# Patient Record
Sex: FEMALE | Race: WHITE | NOT HISPANIC OR LATINO
[De-identification: names, ages, dates, MRNs, and addresses within clinical notes are randomized per-mention and may not be internally consistent; named-entity substitution may affect disease eponyms.]

---

## 2017-10-05 ENCOUNTER — TRANSCRIPTION ENCOUNTER (OUTPATIENT)
Age: 82
End: 2017-10-05

## 2018-01-15 ENCOUNTER — APPOINTMENT (OUTPATIENT)
Dept: SURGICAL ONCOLOGY | Facility: CLINIC | Age: 83
End: 2018-01-15
Payer: MEDICARE

## 2018-01-15 VITALS
HEART RATE: 77 BPM | SYSTOLIC BLOOD PRESSURE: 182 MMHG | DIASTOLIC BLOOD PRESSURE: 73 MMHG | WEIGHT: 118 LBS | BODY MASS INDEX: 23.16 KG/M2 | HEIGHT: 60 IN | OXYGEN SATURATION: 100 %

## 2018-01-15 DIAGNOSIS — N64.4 MASTODYNIA: ICD-10-CM

## 2018-01-15 DIAGNOSIS — Z78.9 OTHER SPECIFIED HEALTH STATUS: ICD-10-CM

## 2018-01-15 PROCEDURE — 99204 OFFICE O/P NEW MOD 45 MIN: CPT

## 2018-01-26 ENCOUNTER — APPOINTMENT (OUTPATIENT)
Dept: COLORECTAL SURGERY | Facility: CLINIC | Age: 83
End: 2018-01-26
Payer: MEDICARE

## 2018-01-26 PROCEDURE — 45300 PROCTOSIGMOIDOSCOPY DX: CPT

## 2019-03-15 ENCOUNTER — APPOINTMENT (OUTPATIENT)
Dept: SURGERY | Facility: CLINIC | Age: 84
End: 2019-03-15
Payer: MEDICARE

## 2019-03-15 VITALS
WEIGHT: 115 LBS | TEMPERATURE: 98.1 F | DIASTOLIC BLOOD PRESSURE: 72 MMHG | RESPIRATION RATE: 15 BRPM | HEART RATE: 78 BPM | HEIGHT: 60 IN | OXYGEN SATURATION: 96 % | SYSTOLIC BLOOD PRESSURE: 121 MMHG | BODY MASS INDEX: 22.58 KG/M2

## 2019-03-15 DIAGNOSIS — H35.30 UNSPECIFIED MACULAR DEGENERATION: ICD-10-CM

## 2019-03-15 DIAGNOSIS — Z87.891 PERSONAL HISTORY OF NICOTINE DEPENDENCE: ICD-10-CM

## 2019-03-15 DIAGNOSIS — Z85.048 PERSONAL HISTORY OF OTHER MALIGNANT NEOPLASM OF RECTUM, RECTOSIGMOID JUNCTION, AND ANUS: ICD-10-CM

## 2019-03-15 DIAGNOSIS — I10 ESSENTIAL (PRIMARY) HYPERTENSION: ICD-10-CM

## 2019-03-15 DIAGNOSIS — Z82.49 FAMILY HISTORY OF ISCHEMIC HEART DISEASE AND OTHER DISEASES OF THE CIRCULATORY SYSTEM: ICD-10-CM

## 2019-03-15 PROCEDURE — 99215 OFFICE O/P EST HI 40 MIN: CPT

## 2019-03-15 PROCEDURE — 99205 OFFICE O/P NEW HI 60 MIN: CPT

## 2019-03-15 RX ORDER — AMLODIPINE BESYLATE 2.5 MG/1
2.5 TABLET ORAL TWICE DAILY
Refills: 0 | Status: ACTIVE | COMMUNITY

## 2019-03-15 RX ORDER — TRAVOPROST (BENZALKONIUM) 0.004 %
DROPS OPHTHALMIC (EYE)
Refills: 0 | Status: ACTIVE | COMMUNITY

## 2019-03-15 NOTE — HISTORY OF PRESENT ILLNESS
[de-identified] : Kortney is an 87 y/o female here for evaluation of abdominal wall bulge.  [de-identified] : Status post partial colectomy for cancer in 2015. Patient developed a postop wound infection which eventually healed with local care. 2 weeks ago she first noted a bulge in the lower midline area adjacent to her surgical scar. No pain or tenderness and no other abdominal symptoms or change in bowel habits. Bulge reduces with recumbency.

## 2019-03-15 NOTE — ASSESSMENT
[FreeTextEntry1] : 80-year-old hypertensive female with recently noted, asymptomatic incisional hernia.

## 2019-03-15 NOTE — REVIEW OF SYSTEMS
[As Noted in HPI] : as noted in HPI [Negative] : Heme/Lymph [Constipation] : constipation [Joint Pain] : joint pain [FreeTextEntry3] : Macular degeneration

## 2019-03-15 NOTE — PLAN
[FreeTextEntry1] : Advised observation for the time being. Discussed with patient and daughter nature of, indications for and risks/benefits of surgery. Patient to return in 2 months for recheck or call sooner if symptoms develop.

## 2019-03-15 NOTE — PHYSICAL EXAM
[Normal Thyroid] : the thyroid was normal [Respiratory Effort] : normal respiratory effort [Normal Rate and Rhythm] : normal rate and rhythm [Abdominal Aorta] : Normal abdominal aorta [No Rash or Lesion] : No rash or lesion [Oriented to Person] : oriented to person [Oriented to Place] : oriented to place [Oriented to Time] : oriented to time [Calm] : calm [de-identified] : In no distress [de-identified] : Normocephalic, atraumatic [de-identified] : No palpable adenopathy [de-identified] : Soft, nondistended, nontender. No palpable mass or organomegaly. Well-healed lower midline surgical scar. At the midpoint of the scar, moderate sized, nontender, reducible incisional hernia. [de-identified] : Normal gait; no deformities [de-identified] : No gross sensory or motor deficits [de-identified] : Normal mood and affect

## 2019-05-31 ENCOUNTER — APPOINTMENT (OUTPATIENT)
Dept: SURGERY | Facility: CLINIC | Age: 84
End: 2019-05-31
Payer: MEDICARE

## 2019-05-31 VITALS
DIASTOLIC BLOOD PRESSURE: 72 MMHG | TEMPERATURE: 97.7 F | OXYGEN SATURATION: 98 % | HEART RATE: 80 BPM | SYSTOLIC BLOOD PRESSURE: 135 MMHG | RESPIRATION RATE: 15 BRPM

## 2019-05-31 DIAGNOSIS — Z09 ENCOUNTER FOR FOLLOW-UP EXAMINATION AFTER COMPLETED TREATMENT FOR CONDITIONS OTHER THAN MALIGNANT NEOPLASM: ICD-10-CM

## 2019-05-31 PROCEDURE — 99213 OFFICE O/P EST LOW 20 MIN: CPT

## 2019-05-31 NOTE — PHYSICAL EXAM
[de-identified] : Soft, nondistended, nontender. No palpable mass or organomegaly. Well-healed lower midline surgical scar with 3-4 cm nontender reducible incisional hernia containing bowel.

## 2019-05-31 NOTE — HISTORY OF PRESENT ILLNESS
[de-identified] : Kortney is an 89 y/o female here for follow up visit of asymptomatic incisional hernia. Last seen on 3/15/19.  [de-identified] : No significant changes since last visit. No pain or tenderness and no significant change in the size of the hernia bulge. Tolerating diet well and habits unchanged (long history of constipation).

## 2019-10-04 ENCOUNTER — APPOINTMENT (OUTPATIENT)
Dept: SURGERY | Facility: CLINIC | Age: 84
End: 2019-10-04
Payer: MEDICARE

## 2019-10-04 VITALS
OXYGEN SATURATION: 98 % | HEART RATE: 73 BPM | DIASTOLIC BLOOD PRESSURE: 66 MMHG | TEMPERATURE: 97.7 F | SYSTOLIC BLOOD PRESSURE: 128 MMHG | RESPIRATION RATE: 16 BRPM

## 2019-10-04 PROCEDURE — 99213 OFFICE O/P EST LOW 20 MIN: CPT

## 2019-10-04 NOTE — PLAN
[FreeTextEntry1] : Continue observation. Patient to return in 6 months for recheck or call sooner if symptoms develop.

## 2019-10-04 NOTE — PHYSICAL EXAM
[de-identified] : Soft, nondistended, nontender. No palpable mass or organomegaly there were midline hernia unchanged from prior exam. Hernia remains nontender and readily reducible. Some attenuation of fascia along the remainder of the scar but no other areas of ghazala herniation.

## 2019-10-04 NOTE — HISTORY OF PRESENT ILLNESS
[de-identified] : Kortney is an 89 y/o female here for follow up visit of asymptomatic incisional hernia. Last seen on 5/31/19.  [de-identified] : No new complaints since last visit. No obvious progression in size of hernia. Tolerating diet well and maintaining regular bowel activity with MiraLax (no change from usual pattern).

## 2020-09-04 ENCOUNTER — APPOINTMENT (OUTPATIENT)
Dept: SURGERY | Facility: CLINIC | Age: 85
End: 2020-09-04
Payer: MEDICARE

## 2020-09-04 VITALS
RESPIRATION RATE: 15 BRPM | HEART RATE: 81 BPM | OXYGEN SATURATION: 99 % | DIASTOLIC BLOOD PRESSURE: 72 MMHG | SYSTOLIC BLOOD PRESSURE: 152 MMHG | TEMPERATURE: 97.6 F

## 2020-09-04 PROCEDURE — 99213 OFFICE O/P EST LOW 20 MIN: CPT

## 2020-09-04 NOTE — PLAN
[FreeTextEntry1] : To continue observation. Patient to return in 6 months for recheck or call sooner if problems develop.

## 2020-09-04 NOTE — PHYSICAL EXAM
[de-identified] : Soft, nondistended, nontender. Midline incisional hernia nontender and fully reducible, no larger than at last exam.

## 2020-09-04 NOTE — HISTORY OF PRESENT ILLNESS
[de-identified] : Kortney is a 89 y/o female here for follow up visit of asymptomatic incisional hernia. Last seen on 10/4/19, patient doing well. Instructed to continue to observe hernia.  [de-identified] : No new complaints since last visit. Patient does not feel that the bulge has enlarged. Tolerating diet well and having regular bowel activity.

## 2021-03-24 ENCOUNTER — APPOINTMENT (OUTPATIENT)
Dept: SURGERY | Facility: CLINIC | Age: 86
End: 2021-03-24
Payer: MEDICARE

## 2021-03-24 VITALS
DIASTOLIC BLOOD PRESSURE: 68 MMHG | RESPIRATION RATE: 16 BRPM | HEART RATE: 82 BPM | SYSTOLIC BLOOD PRESSURE: 148 MMHG | OXYGEN SATURATION: 97 % | TEMPERATURE: 97.6 F

## 2021-03-24 DIAGNOSIS — K43.2 INCISIONAL HERNIA W/OUT OBSTRUCTION OR GANGRENE: ICD-10-CM

## 2021-03-24 PROCEDURE — 99212 OFFICE O/P EST SF 10 MIN: CPT

## 2021-03-24 RX ORDER — BRINZOLAMIDE/BRIMONIDINE TARTRATE 10; 2 MG/ML; MG/ML
1-0.2 SUSPENSION/ DROPS OPHTHALMIC
Refills: 0 | Status: DISCONTINUED | COMMUNITY
End: 2021-03-24

## 2021-03-24 NOTE — ASSESSMENT
[FreeTextEntry1] : 89-year-old female with long-standing, small, minimally symptomatic incisional hernia, unchanged on serial exams.

## 2021-03-24 NOTE — HISTORY OF PRESENT ILLNESS
[de-identified] : Kortney is a 89 y/o female here for follow up visit of asymptomatic incisional hernia. Last seen on 9/4/2020- advised to continue observation. Patient to return in 6 months for recheck.  No new complaints since last visit. Patient does not feel that the bulge has enlarged. Tolerating diet well and having regular bowel activity. \par Denies pain.  [de-identified] : 89-year-old female with known lower midline incisional hernia status post prior colectomy for cancer. No new complaints since last visit here in September of 2020. Has only occasional, mild discomfort at the site of the hernia but no increase in size noted. Tolerating diet and having regular bowel activity with the help of Col[employer].

## 2021-03-24 NOTE — PHYSICAL EXAM
[de-identified] : Soft, nondistended, nontender. No palpable mass or organomegaly. Well-healed lower midline surgical scar with 3 cm, nontender, reducible hernia at the midpoint of the scar. Hernia unchanged from prior exam.

## 2021-04-09 ENCOUNTER — APPOINTMENT (OUTPATIENT)
Dept: SURGERY | Facility: CLINIC | Age: 86
End: 2021-04-09

## 2021-06-18 ENCOUNTER — APPOINTMENT (OUTPATIENT)
Dept: SURGERY | Facility: CLINIC | Age: 86
End: 2021-06-18
Payer: MEDICARE

## 2021-06-18 VITALS
RESPIRATION RATE: 15 BRPM | SYSTOLIC BLOOD PRESSURE: 159 MMHG | HEART RATE: 85 BPM | DIASTOLIC BLOOD PRESSURE: 78 MMHG | OXYGEN SATURATION: 98 % | TEMPERATURE: 97.7 F

## 2021-06-18 DIAGNOSIS — R10.9 UNSPECIFIED ABDOMINAL PAIN: ICD-10-CM

## 2021-06-18 LAB
BUN SERPL-MCNC: 18 MG/DL
CREAT SERPL-MCNC: 0.82 MG/DL

## 2021-06-18 PROCEDURE — 99212 OFFICE O/P EST SF 10 MIN: CPT

## 2021-06-18 RX ORDER — VIT C/E/ZN/COPPR/LUTEIN/ZEAXAN 250MG-90MG
CAPSULE ORAL
Refills: 0 | Status: ACTIVE | COMMUNITY

## 2021-06-18 NOTE — HISTORY OF PRESENT ILLNESS
[de-identified] : Kortney is a 89 y/o female here for follow up visit of asymptomatic incisional hernia. Last seen on 3/24/21- advised to continue observation.  [de-identified] : Over the past 2 weeks the patient has been experiencing bilateral lower quadrant abdominal pain which she describes as aching in character. Pain is exacerbated somewhat by walking and partially alleviated by sitting. No associated nausea or, vomiting fever, chills or change in bowel habits. Recently patient has been experiencing severe back pain due to spinal stenosis for which she has received attempts at facet injections and epidural injection without significant relief. Patient has prior history of diverticulitis as well.

## 2021-06-18 NOTE — PHYSICAL EXAM
[de-identified] : Soft, nondistended, nontender. No palpable mass or organomegaly. No peritoneal signs. Multiple, small midline fascial defects nontender and fully reducible, unchanged from prior exam.

## 2021-06-18 NOTE — ASSESSMENT
[FreeTextEntry1] : 89-year-old female with two-week history of bilateral lower bowel pain in the absence of any other associated GI symptoms or fever. Unremarkable physical exam without any suggestion of active intra-abdominal inflammatory process. Pain possibly referred due to spinal condition.

## 2021-06-18 NOTE — PLAN
[FreeTextEntry1] : Patient to undergo abdominopelvic CT scan to rule out possibility of diverticulitis or some other occult inflammatory process.

## 2022-03-19 ENCOUNTER — TRANSCRIPTION ENCOUNTER (OUTPATIENT)
Age: 87
End: 2022-03-19

## 2023-02-17 ENCOUNTER — APPOINTMENT (OUTPATIENT)
Dept: COLORECTAL SURGERY | Facility: CLINIC | Age: 88
End: 2023-02-17
Payer: MEDICARE

## 2023-02-17 DIAGNOSIS — K64.9 UNSPECIFIED HEMORRHOIDS: ICD-10-CM

## 2023-02-17 PROCEDURE — 45300 PROCTOSIGMOIDOSCOPY DX: CPT | Mod: 59

## 2023-02-17 PROCEDURE — 46221 LIGATION OF HEMORRHOID(S): CPT

## 2023-02-17 PROCEDURE — 99212 OFFICE O/P EST SF 10 MIN: CPT | Mod: 25

## 2023-02-17 RX ORDER — HYDROCORTISONE 25 MG/G
2.5 CREAM TOPICAL
Qty: 1 | Refills: 0 | Status: ACTIVE | COMMUNITY
Start: 2023-02-17 | End: 1900-01-01

## 2023-02-17 NOTE — HISTORY OF PRESENT ILLNESS
[FreeTextEntry1] : Very pleasant 89-year-old female who is well-known to me and presents for follow-up visit.\par \par Approximately 8 years ago I performed an anterior resection on her for a carcinoma.  She looks and feels well having no evidence of recurrent disease.  She presents today with complaints of perianal tissue prolapse and occasional painless bright red rectal bleeding.\par \par Physical examination reveals an extremely spry 89-year-old female who appears younger than her stated age.  Physical examination reveals a small incisional hernia which is asymptomatic.  Inspection of the anus reveals circumferential external hemorrhoids.  Digital exam reveals no palpable mass.  Limited sigmoidoscopy reveals no evidence of recurrence at her colorectal anastomosis.  Anoscopy confirms sizable internal hemorrhoids.  The right anterior hemorrhoid was isolated and the band was deployed without difficulty.\par \par Patient will return to complete her hemorrhoidal banding trial.  I have advised them to use hydrocortisone externally for symptomatic relief also.

## 2023-03-31 ENCOUNTER — APPOINTMENT (OUTPATIENT)
Dept: COLORECTAL SURGERY | Facility: CLINIC | Age: 88
End: 2023-03-31
Payer: MEDICARE

## 2023-03-31 PROCEDURE — 46221 LIGATION OF HEMORRHOID(S): CPT

## 2023-03-31 NOTE — HISTORY OF PRESENT ILLNESS
[FreeTextEntry1] : Pleasant 89-year-old female who presents for second hemorrhoidal banding.\par \par The patient was examined in the left lateral decubitus position.  An anoscope was introduced and the left lateral hemorrhoid was isolated.  A band was deployed without difficulty.\par \par I will see her in 3 weeks for third banding.

## 2023-05-26 ENCOUNTER — APPOINTMENT (OUTPATIENT)
Dept: COLORECTAL SURGERY | Facility: CLINIC | Age: 88
End: 2023-05-26
Payer: MEDICARE

## 2023-05-26 PROCEDURE — 46221 LIGATION OF HEMORRHOID(S): CPT

## 2023-05-26 NOTE — HISTORY OF PRESENT ILLNESS
[FreeTextEntry1] : Pleasant 89-year-old female who presents for her third hemorrhoidal banding.\par \par The patient was placed in the left lateral decubitus position.  An anoscope was introduced and the right posterior hemorrhoidal column was isolated.  A band was deployed without difficulty.\par \par I will see her as needed.